# Patient Record
Sex: FEMALE | Race: WHITE | NOT HISPANIC OR LATINO | URBAN - METROPOLITAN AREA
[De-identification: names, ages, dates, MRNs, and addresses within clinical notes are randomized per-mention and may not be internally consistent; named-entity substitution may affect disease eponyms.]

---

## 2018-06-27 PROBLEM — 266435005 GASTRO-ESOPHAGEAL REFLUX DISEASE WITHOUT ESOPHAGITIS: Status: ACTIVE | Noted: 2018-06-27

## 2018-06-27 PROBLEM — 428283002 HISTORY OF POLYP OF COLON (SITUATION): Status: ACTIVE | Noted: 2018-06-27

## 2019-12-11 PROBLEM — 133621000119103 LONG TERM CURRENT USE OF NON-STEROIDAL ANTI-INFLAMMATORY DRUG: Status: ACTIVE | Noted: 2019-12-11

## 2019-12-11 PROBLEM — 414916001 OBESITY: Status: ACTIVE | Noted: 2019-12-11

## 2019-12-11 PROBLEM — 55822004 HYPERLIPIDEMIA: Status: ACTIVE | Noted: 2019-12-11

## 2022-04-30 ENCOUNTER — TELEPHONE ENCOUNTER (OUTPATIENT)
Dept: URBAN - METROPOLITAN AREA CLINIC 121 | Facility: CLINIC | Age: 67
End: 2022-04-30

## 2022-04-30 RX ORDER — ESCITALOPRAM 20 MG/1
TABLET, FILM COATED ORAL
OUTPATIENT
Start: 2018-06-27 | End: 2019-12-30

## 2022-04-30 RX ORDER — ESCITALOPRAM 20 MG/1
TABLET, FILM COATED ORAL
OUTPATIENT
Start: 2018-06-27

## 2022-05-01 ENCOUNTER — TELEPHONE ENCOUNTER (OUTPATIENT)
Dept: URBAN - METROPOLITAN AREA CLINIC 121 | Facility: CLINIC | Age: 67
End: 2022-05-01

## 2022-05-01 RX ORDER — FLUOXETINE HCL 10 MG
DAILY CAPSULE ORAL
Status: ACTIVE | COMMUNITY
Start: 2019-12-30

## 2022-05-01 RX ORDER — SIMVASTATIN 80 MG/1
TABLET, FILM COATED ORAL
Status: ACTIVE | COMMUNITY
Start: 2018-06-27

## 2022-05-01 RX ORDER — GABAPENTIN 100 MG/1
CAPSULE ORAL
Status: ACTIVE | COMMUNITY
Start: 2010-12-20

## 2022-05-01 RX ORDER — MELOXICAM 7.5 MG/1
TABLET ORAL
Status: ACTIVE | COMMUNITY
Start: 2018-06-27

## 2022-05-01 RX ORDER — LISINOPRIL 10 MG/1
TABLET ORAL
Status: ACTIVE | COMMUNITY
Start: 2010-12-20

## 2022-05-01 RX ORDER — GLIMEPIRIDE 1 MG/1
TABLET ORAL
Status: ACTIVE | COMMUNITY
Start: 2010-12-20

## 2022-05-01 RX ORDER — METFORMIN HCL 1000 MG/1
TABLET ORAL
Status: ACTIVE | COMMUNITY
Start: 2010-12-20

## 2023-08-08 ENCOUNTER — TELEPHONE ENCOUNTER (OUTPATIENT)
Dept: URBAN - METROPOLITAN AREA CLINIC 60 | Facility: CLINIC | Age: 68
End: 2023-08-08

## 2023-08-10 ENCOUNTER — LAB OUTSIDE AN ENCOUNTER (OUTPATIENT)
Dept: URBAN - METROPOLITAN AREA CLINIC 60 | Facility: CLINIC | Age: 68
End: 2023-08-10

## 2023-09-08 ENCOUNTER — OFFICE VISIT (OUTPATIENT)
Dept: URBAN - METROPOLITAN AREA CLINIC 27 | Facility: CLINIC | Age: 68
End: 2023-09-08

## 2023-09-22 ENCOUNTER — WEB ENCOUNTER (OUTPATIENT)
Dept: URBAN - METROPOLITAN AREA SURGERY CENTER 7 | Facility: SURGERY CENTER | Age: 68
End: 2023-09-22

## 2023-09-28 ENCOUNTER — CLAIMS CREATED FROM THE CLAIM WINDOW (OUTPATIENT)
Dept: URBAN - METROPOLITAN AREA CLINIC 4 | Facility: CLINIC | Age: 68
End: 2023-09-28
Payer: COMMERCIAL

## 2023-09-28 ENCOUNTER — CLAIMS CREATED FROM THE CLAIM WINDOW (OUTPATIENT)
Dept: URBAN - METROPOLITAN AREA SURGERY CENTER 7 | Facility: SURGERY CENTER | Age: 68
End: 2023-09-28
Payer: COMMERCIAL

## 2023-09-28 ENCOUNTER — WEB ENCOUNTER (OUTPATIENT)
Dept: URBAN - METROPOLITAN AREA SURGERY CENTER 7 | Facility: SURGERY CENTER | Age: 68
End: 2023-09-28

## 2023-09-28 DIAGNOSIS — K63.89 APPENDICITIS EPIPLOICA: ICD-10-CM

## 2023-09-28 DIAGNOSIS — K63.89 OTHER SPECIFIED DISEASES OF INTESTINE: ICD-10-CM

## 2023-09-28 DIAGNOSIS — Z98.0 H/O BILLROTH II OPERATION: ICD-10-CM

## 2023-09-28 DIAGNOSIS — D12.3 ADENOMATOUS POLYP OF TRANSVERSE COLON: ICD-10-CM

## 2023-09-28 DIAGNOSIS — Z86.010 ADENOMAS PERSONAL HISTORY OF COLONIC POLYPS: ICD-10-CM

## 2023-09-28 DIAGNOSIS — Z12.11 COLON CANCER SCREENING (HIGH RISK): ICD-10-CM

## 2023-09-28 DIAGNOSIS — Z98.0 INTESTINAL ANASTOMOSIS PRESENT: ICD-10-CM

## 2023-09-28 DIAGNOSIS — Z86.010 PERSONAL HISTORY OF COLON POLYPS: ICD-10-CM

## 2023-09-28 PROCEDURE — G8907 PT DOC NO EVENTS ON DISCHARG: HCPCS | Performed by: INTERNAL MEDICINE

## 2023-09-28 PROCEDURE — 00811 ANES LWR INTST NDSC NOS: CPT | Performed by: NURSE ANESTHETIST, CERTIFIED REGISTERED

## 2023-09-28 PROCEDURE — 45380 COLONOSCOPY AND BIOPSY: CPT | Performed by: INTERNAL MEDICINE

## 2023-09-28 PROCEDURE — 88305 TISSUE EXAM BY PATHOLOGIST: CPT | Performed by: PATHOLOGY

## 2023-09-28 RX ORDER — GABAPENTIN 100 MG/1
CAPSULE ORAL
Status: ACTIVE | COMMUNITY
Start: 2010-12-20

## 2023-09-28 RX ORDER — SIMVASTATIN 80 MG/1
TABLET, FILM COATED ORAL
Status: ACTIVE | COMMUNITY
Start: 2018-06-27

## 2023-09-28 RX ORDER — MELOXICAM 7.5 MG/1
TABLET ORAL
Status: ACTIVE | COMMUNITY
Start: 2018-06-27

## 2023-09-28 RX ORDER — FLUOXETINE HCL 10 MG
DAILY CAPSULE ORAL
Status: ACTIVE | COMMUNITY
Start: 2019-12-30

## 2023-09-28 RX ORDER — METFORMIN HCL 1000 MG/1
TABLET ORAL
Status: ACTIVE | COMMUNITY
Start: 2010-12-20

## 2023-09-28 RX ORDER — LISINOPRIL 10 MG/1
TABLET ORAL
Status: ACTIVE | COMMUNITY
Start: 2010-12-20

## 2023-09-28 RX ORDER — GLIMEPIRIDE 1 MG/1
TABLET ORAL
Status: ACTIVE | COMMUNITY
Start: 2010-12-20

## 2024-06-05 ENCOUNTER — DASHBOARD ENCOUNTERS (OUTPATIENT)
Age: 69
End: 2024-06-05

## 2024-06-05 ENCOUNTER — OFFICE VISIT (OUTPATIENT)
Dept: URBAN - METROPOLITAN AREA CLINIC 27 | Facility: CLINIC | Age: 69
End: 2024-06-05
Payer: COMMERCIAL

## 2024-06-05 VITALS
HEIGHT: 66 IN | WEIGHT: 180 LBS | HEART RATE: 102 BPM | SYSTOLIC BLOOD PRESSURE: 134 MMHG | DIASTOLIC BLOOD PRESSURE: 80 MMHG | BODY MASS INDEX: 28.93 KG/M2

## 2024-06-05 DIAGNOSIS — R11.2 NAUSEA WITH VOMITING, UNSPECIFIED: ICD-10-CM

## 2024-06-05 DIAGNOSIS — E66.9 OBESITY, UNSPECIFIED: ICD-10-CM

## 2024-06-05 DIAGNOSIS — R19.7 DIARRHEA, UNSPECIFIED: ICD-10-CM

## 2024-06-05 DIAGNOSIS — I10 ESSENTIAL (PRIMARY) HYPERTENSION: ICD-10-CM

## 2024-06-05 DIAGNOSIS — E11.9 TYPE 2 DIABETES MELLITUS WITHOUT COMPLICATIONS: ICD-10-CM

## 2024-06-05 PROCEDURE — 99214 OFFICE O/P EST MOD 30 MIN: CPT | Performed by: INTERNAL MEDICINE

## 2024-06-05 RX ORDER — FLUOXETINE HCL 10 MG
DAILY CAPSULE ORAL
Status: ACTIVE | COMMUNITY
Start: 2019-12-30

## 2024-06-05 RX ORDER — GABAPENTIN 100 MG/1
CAPSULE ORAL
Status: ACTIVE | COMMUNITY
Start: 2010-12-20

## 2024-06-05 RX ORDER — LISINOPRIL 10 MG/1
TABLET ORAL
Status: ACTIVE | COMMUNITY
Start: 2010-12-20

## 2024-06-05 RX ORDER — MELOXICAM 7.5 MG/1
TABLET ORAL
Status: ACTIVE | COMMUNITY
Start: 2018-06-27

## 2024-06-05 RX ORDER — GLIMEPIRIDE 1 MG/1
TABLET ORAL
Status: ACTIVE | COMMUNITY
Start: 2010-12-20

## 2024-06-05 RX ORDER — SIMVASTATIN 80 MG/1
TABLET, FILM COATED ORAL
Status: ACTIVE | COMMUNITY
Start: 2018-06-27

## 2024-06-05 RX ORDER — METFORMIN HCL 1000 MG/1
TABLET ORAL
Status: ACTIVE | COMMUNITY
Start: 2010-12-20

## 2024-06-05 NOTE — HPI-TODAY'S VISIT:
Is a 68-year-old female seen in consultation for a change in bowel habits with increasing urgency after eating over the last 1 to 2 months.  She will have some liquid stools dark green or orange.  She has some nausea and decreased appetite.  She did have an episode of vomiting.  She has noticed that her acid reflux has been worsening as well over the past 2 to 3 months.  She is taking omeprazole now twice a day but that is not helping.  She feels regurgitation but no dysphagia.  She has lost a few pounds.  She states she did have routine laboratory studies in her primary care office during this time and they were normal.  She has type 2 diabetes with an A1c of 6.8.  She is status postcholecystectomy and splenectomy.  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

## 2024-06-06 ENCOUNTER — LAB OUTSIDE AN ENCOUNTER (OUTPATIENT)
Dept: URBAN - METROPOLITAN AREA CLINIC 27 | Facility: CLINIC | Age: 69
End: 2024-06-06

## 2024-07-23 ENCOUNTER — OFFICE VISIT (OUTPATIENT)
Dept: URBAN - METROPOLITAN AREA MEDICAL CENTER 8 | Facility: MEDICAL CENTER | Age: 69
End: 2024-07-23

## 2024-07-23 RX ORDER — GABAPENTIN 100 MG/1
CAPSULE ORAL
Status: ACTIVE | COMMUNITY
Start: 2010-12-20

## 2024-07-23 RX ORDER — LISINOPRIL 10 MG/1
TABLET ORAL
Status: ACTIVE | COMMUNITY
Start: 2010-12-20

## 2024-07-23 RX ORDER — SIMVASTATIN 80 MG/1
TABLET, FILM COATED ORAL
Status: ACTIVE | COMMUNITY
Start: 2018-06-27

## 2024-07-23 RX ORDER — GLIMEPIRIDE 1 MG/1
TABLET ORAL
Status: ACTIVE | COMMUNITY
Start: 2010-12-20

## 2024-07-23 RX ORDER — MELOXICAM 7.5 MG/1
TABLET ORAL
Status: ACTIVE | COMMUNITY
Start: 2018-06-27

## 2024-07-23 RX ORDER — FLUOXETINE HCL 10 MG
DAILY CAPSULE ORAL
Status: ACTIVE | COMMUNITY
Start: 2019-12-30

## 2024-07-23 RX ORDER — METFORMIN HCL 1000 MG/1
TABLET ORAL
Status: ACTIVE | COMMUNITY
Start: 2010-12-20

## 2024-08-08 ENCOUNTER — TELEPHONE ENCOUNTER (OUTPATIENT)
Dept: URBAN - METROPOLITAN AREA CLINIC 27 | Facility: CLINIC | Age: 69
End: 2024-08-08

## 2024-08-08 ENCOUNTER — OFFICE VISIT (OUTPATIENT)
Dept: URBAN - METROPOLITAN AREA TELEHEALTH 2 | Facility: TELEHEALTH | Age: 69
End: 2024-08-08
Payer: COMMERCIAL

## 2024-08-08 DIAGNOSIS — K21.9 GERD: ICD-10-CM

## 2024-08-08 DIAGNOSIS — R19.7 DIARRHEA: ICD-10-CM

## 2024-08-08 DIAGNOSIS — E11.9 TYPE 2 DIABETES MELLITUS WITHOUT COMPLICATIONS: ICD-10-CM

## 2024-08-08 DIAGNOSIS — I10 ESSENTIAL (PRIMARY) HYPERTENSION: ICD-10-CM

## 2024-08-08 PROCEDURE — 99214 OFFICE O/P EST MOD 30 MIN: CPT | Performed by: INTERNAL MEDICINE

## 2024-08-08 RX ORDER — METFORMIN HCL 1000 MG/1
TABLET ORAL
Status: ACTIVE | COMMUNITY
Start: 2010-12-20

## 2024-08-08 RX ORDER — GABAPENTIN 100 MG/1
CAPSULE ORAL
Status: ACTIVE | COMMUNITY
Start: 2010-12-20

## 2024-08-08 RX ORDER — MELOXICAM 7.5 MG/1
TABLET ORAL
Status: ACTIVE | COMMUNITY
Start: 2018-06-27

## 2024-08-08 RX ORDER — FLUOXETINE HCL 10 MG
DAILY CAPSULE ORAL
Status: ACTIVE | COMMUNITY
Start: 2019-12-30

## 2024-08-08 RX ORDER — SIMVASTATIN 80 MG/1
TABLET, FILM COATED ORAL
Status: ACTIVE | COMMUNITY
Start: 2018-06-27

## 2024-08-08 RX ORDER — LISINOPRIL 10 MG/1
TABLET ORAL
Status: ACTIVE | COMMUNITY
Start: 2010-12-20

## 2024-08-08 RX ORDER — GLIMEPIRIDE 1 MG/1
TABLET ORAL
Status: ACTIVE | COMMUNITY
Start: 2010-12-20

## 2024-08-08 NOTE — HPI-TODAY'S VISIT:
This is a 69-year-old female seen in consultation for her reflux.  She has been having some severe reflux symptoms with breakthrough despite using omeprazole.  She recently underwent manometry and pH studies.  She denies any chest pain or discomfort but manometry showed a hypercontractile esophagus.  Bravo pH study was positive.  She also complains of about a few months of loose stools.  She will eat and then have the urgency.  There is no bleeding.  She is taking Benefiber intermittently but states it is not helping.  She will have 3-4 looser stools each day.  No weight loss

## 2024-08-09 ENCOUNTER — TELEPHONE ENCOUNTER (OUTPATIENT)
Dept: URBAN - METROPOLITAN AREA CLINIC 27 | Facility: CLINIC | Age: 69
End: 2024-08-09

## 2024-10-08 ENCOUNTER — TELEPHONE ENCOUNTER (OUTPATIENT)
Dept: URBAN - METROPOLITAN AREA CLINIC 27 | Facility: CLINIC | Age: 69
End: 2024-10-08

## 2024-10-08 RX ORDER — FIDAXOMICIN 200 MG/1
1 TABLET TABLET, FILM COATED ORAL TWICE A DAY
Qty: 20 | OUTPATIENT
Start: 2024-10-08 | End: 2024-10-18